# Patient Record
Sex: MALE | Race: WHITE | URBAN - METROPOLITAN AREA
[De-identification: names, ages, dates, MRNs, and addresses within clinical notes are randomized per-mention and may not be internally consistent; named-entity substitution may affect disease eponyms.]

---

## 2018-05-21 ENCOUNTER — PATIENT OUTREACH (OUTPATIENT)
Dept: FAMILY MEDICINE CLINIC | Facility: CLINIC | Age: 58
End: 2018-05-21

## 2018-05-22 NOTE — PROGRESS NOTES
Please call pt to inquire if pt has had a colonoscopy in the last 10 years and if so who performed it (name and phone #). Please let Lois Castelan know so I can obtain the report.     If pt did not have a colonoscopy please advise them we will leave the FIT stoo